# Patient Record
Sex: FEMALE | Race: WHITE | NOT HISPANIC OR LATINO
[De-identification: names, ages, dates, MRNs, and addresses within clinical notes are randomized per-mention and may not be internally consistent; named-entity substitution may affect disease eponyms.]

---
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
## 2020-01-08 ENCOUNTER — APPOINTMENT (OUTPATIENT)
Dept: ORTHOPEDIC SURGERY | Facility: AMBULATORY SURGERY CENTER | Age: 20
End: 2020-01-08
Payer: COMMERCIAL

## 2020-01-08 ENCOUNTER — OUTPATIENT (OUTPATIENT)
Dept: OUTPATIENT SERVICES | Facility: HOSPITAL | Age: 20
LOS: 1 days | Discharge: HOME | End: 2020-01-08

## 2020-01-08 PROCEDURE — 29846 WRIST ARTHROSCOPY/SURGERY: CPT | Mod: LT

## 2020-01-17 ENCOUNTER — APPOINTMENT (OUTPATIENT)
Dept: ORTHOPEDIC SURGERY | Facility: CLINIC | Age: 20
End: 2020-01-17
Payer: COMMERCIAL

## 2020-01-17 PROCEDURE — 99024 POSTOP FOLLOW-UP VISIT: CPT

## 2020-01-17 RX ORDER — ACETAMINOPHEN AND CODEINE 300; 30 MG/1; MG/1
300-30 TABLET ORAL
Qty: 20 | Refills: 0 | Status: DISCONTINUED | COMMUNITY
Start: 2020-01-07 | End: 2020-01-17

## 2020-02-21 ENCOUNTER — APPOINTMENT (OUTPATIENT)
Dept: ORTHOPEDIC SURGERY | Facility: CLINIC | Age: 20
End: 2020-02-21
Payer: COMMERCIAL

## 2020-02-21 VITALS — HEIGHT: 66 IN | WEIGHT: 140 LBS | BODY MASS INDEX: 22.5 KG/M2

## 2020-02-21 DIAGNOSIS — M24.131 OTHER ARTICULAR CARTILAGE DISORDERS, RIGHT WRIST: ICD-10-CM

## 2020-02-21 PROCEDURE — 99024 POSTOP FOLLOW-UP VISIT: CPT

## 2020-02-21 NOTE — HISTORY OF PRESENT ILLNESS
[0] : no pain reported [Healed] : healed [No Sign of Infection] : is showing no signs of infection [Doing Well] : is doing well [Constipation] : no constipation [Chills] : no chills [Dysuria] : no dysuria [Diarrhea] : no diarrhea [Fever] : no fever [Nausea] : no nausea [Vomiting] : no vomiting [Erythema] : not erythematous [Swelling] : not swollen [Discharge] : absent of discharge [de-identified] : DOS-1/8/20 [Dehiscence] : not dehisced [de-identified] : S/P-6 weeks\par Left TFCC debridement and repair. She continues O/T twice a week [de-identified] : The scars appear to be well healed with minimal sensitivity, and there is no instability. Radial, ulnar joint or carpus.\par \par No tenderness over the TFCC and no evidence of instability.\par \par Flexion/extension is 80/60 on the right and 50/40 on the left\par Pronation/supination is 80/80 on the right and 80/70 on the left\par \par  strength is 55 pounds on the right and 20 pounds on the left\par Right-hand dominant\par \par Full range of motion of the digits. Negative Tinel's. Sensation grossly intact [de-identified] : 6 weeks status post left TFCC repair by debridement [de-identified] : Continue occupational therapy and home program\par \par Return to the office in 6-8 weeks.

## 2020-04-23 ENCOUNTER — APPOINTMENT (OUTPATIENT)
Dept: PULMONOLOGY | Facility: CLINIC | Age: 20
End: 2020-04-23
Payer: COMMERCIAL

## 2020-04-23 DIAGNOSIS — R07.89 OTHER CHEST PAIN: ICD-10-CM

## 2020-04-23 PROCEDURE — 99204 OFFICE O/P NEW MOD 45 MIN: CPT | Mod: 95

## 2020-04-23 NOTE — REASON FOR VISIT
[Consultation] : a consultation [Shortness of Breath] : shortness of breath [Chest Pain] : chest pain

## 2020-04-23 NOTE — DISCUSSION/SUMMARY
[FreeTextEntry1] : Etiology of symptoms unclear. ? GERD\par ? comp. of anxiety\par Less likely parenchymal lung, airways or pulmonary vascular disease.
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
## 2020-05-04 ENCOUNTER — LABORATORY RESULT (OUTPATIENT)
Age: 20
End: 2020-05-04

## 2020-05-04 ENCOUNTER — APPOINTMENT (OUTPATIENT)
Dept: PULMONOLOGY | Facility: CLINIC | Age: 20
End: 2020-05-04
Payer: COMMERCIAL

## 2020-05-04 VITALS
RESPIRATION RATE: 14 BRPM | OXYGEN SATURATION: 99 % | SYSTOLIC BLOOD PRESSURE: 121 MMHG | HEART RATE: 66 BPM | TEMPERATURE: 98.1 F | DIASTOLIC BLOOD PRESSURE: 80 MMHG

## 2020-05-04 PROCEDURE — 94010 BREATHING CAPACITY TEST: CPT

## 2020-05-04 PROCEDURE — 99213 OFFICE O/P EST LOW 20 MIN: CPT | Mod: 25

## 2020-05-04 PROCEDURE — 71046 X-RAY EXAM CHEST 2 VIEWS: CPT

## 2020-05-04 RX ORDER — AZITHROMYCIN 250 MG/1
250 TABLET, FILM COATED ORAL
Qty: 6 | Refills: 0 | Status: DISCONTINUED | COMMUNITY
Start: 2020-03-29

## 2020-05-04 RX ORDER — PREDNISONE 5 MG/1
5 TABLET ORAL
Qty: 15 | Refills: 0 | Status: DISCONTINUED | COMMUNITY
Start: 2020-02-14

## 2020-05-04 RX ORDER — CEFUROXIME AXETIL 500 MG/1
500 TABLET ORAL
Qty: 14 | Refills: 0 | Status: DISCONTINUED | COMMUNITY
Start: 2020-02-14

## 2020-05-04 RX ORDER — EPINEPHRINE 0.3 MG/.3ML
0.3 INJECTION INTRAMUSCULAR
Qty: 2 | Refills: 0 | Status: DISCONTINUED | COMMUNITY
Start: 2020-03-05

## 2020-05-04 NOTE — PHYSICAL EXAM
[Normal Appearance] : normal appearance [Normal Oropharynx] : normal oropharynx [No Acute Distress] : no acute distress [No JVD] : no jvd [Normal S1, S2] : normal s1, s2 [Clear to Auscultation Bilaterally] : clear to auscultation bilaterally [Normal to Percussion] : normal to percussion [Benign] : benign [No HSM] : no hsm [No Clubbing] : no clubbing [No Cyanosis] : no cyanosis [No Edema] : no edema

## 2020-05-04 NOTE — DISCUSSION/SUMMARY
[FreeTextEntry1] : Etiology of symptoms unclear. ? MS\par ? comp. of anxiety\par No definitive evidence of airways disease. \par Atypical CP ? MS\par

## 2020-05-04 NOTE — ASSESSMENT
[FreeTextEntry1] : Labs drawn in office today\par Consider trial of bronchodilator therapy pending ability to obtain further workup\par Consider cardiology evaluation.

## 2020-05-04 NOTE — HISTORY OF PRESENT ILLNESS
[Never] : never [TextBox_4] : on PPI but does’t fell better.\par Lately feels tightness above left breast. \par Occ pain in back\par Occ dizzy and light headed.\par ? had feet swollen\par Occurrs both night and day\par \par No cough, no wheezing\par No difficulty working out.\par \par Taking steroid eye drops for "inflammation"\par \par

## 2020-05-04 NOTE — PROCEDURE
[FreeTextEntry1] : 05/04/2020\par Pulmonary function testing\par FEV1, FVC, and FEV1/FVC are within normal limits.

## 2020-05-05 ENCOUNTER — APPOINTMENT (OUTPATIENT)
Dept: PULMONOLOGY | Facility: CLINIC | Age: 20
End: 2020-05-05

## 2020-05-05 DIAGNOSIS — R07.89 OTHER CHEST PAIN: ICD-10-CM

## 2020-05-05 LAB
ALBUMIN SERPL ELPH-MCNC: 5 G/DL
ALP BLD-CCNC: 53 U/L
ALT SERPL-CCNC: 9 U/L
ANION GAP SERPL CALC-SCNC: 11 MMOL/L
AST SERPL-CCNC: 19 U/L
BASOPHILS # BLD AUTO: 0.03 K/UL
BASOPHILS NFR BLD AUTO: 0.4 %
BILIRUB DIRECT SERPL-MCNC: 0.1 MG/DL
BILIRUB INDIRECT SERPL-MCNC: 0.3 MG/DL
BILIRUB SERPL-MCNC: 0.4 MG/DL
BUN SERPL-MCNC: 13 MG/DL
CALCIUM SERPL-MCNC: 10 MG/DL
CHLORIDE SERPL-SCNC: 104 MMOL/L
CHOLEST SERPL-MCNC: 153 MG/DL
CHOLEST/HDLC SERPL: 3 RATIO
CO2 SERPL-SCNC: 24 MMOL/L
CREAT SERPL-MCNC: 0.87 MG/DL
CRP SERPL-MCNC: <0.1 MG/DL
EOSINOPHIL # BLD AUTO: 0.04 K/UL
EOSINOPHIL NFR BLD AUTO: 0.5 %
ERYTHROCYTE [SEDIMENTATION RATE] IN BLOOD BY WESTERGREN METHOD: 10 MM/HR
GLUCOSE SERPL-MCNC: 91 MG/DL
HCT VFR BLD CALC: 38.7 %
HDLC SERPL-MCNC: 50 MG/DL
HGB BLD-MCNC: 13.3 G/DL
IMM GRANULOCYTES NFR BLD AUTO: 0.1 %
LDLC SERPL CALC-MCNC: 90 MG/DL
LYMPHOCYTES # BLD AUTO: 2.12 K/UL
LYMPHOCYTES NFR BLD AUTO: 28 %
MAN DIFF?: NORMAL
MCHC RBC-ENTMCNC: 32.6 PG
MCHC RBC-ENTMCNC: 34.4 GM/DL
MCV RBC AUTO: 94.9 FL
MONOCYTES # BLD AUTO: 0.45 K/UL
MONOCYTES NFR BLD AUTO: 6 %
NEUTROPHILS # BLD AUTO: 4.91 K/UL
NEUTROPHILS NFR BLD AUTO: 65 %
PLATELET # BLD AUTO: 246 K/UL
POTASSIUM SERPL-SCNC: 4.6 MMOL/L
PROT SERPL-MCNC: 7.2 G/DL
RBC # BLD: 4.08 M/UL
RBC # FLD: 12.1 %
SODIUM SERPL-SCNC: 139 MMOL/L
T3 SERPL-MCNC: 81 NG/DL
T3RU NFR SERPL: 0.8 TBI
T4 FREE SERPL-MCNC: 1.2 NG/DL
T4 SERPL-MCNC: 6.3 UG/DL
TRIGL SERPL-MCNC: 61 MG/DL
TSH SERPL-ACNC: 1.26 UIU/ML
WBC # FLD AUTO: 7.56 K/UL

## 2020-05-22 NOTE — PHYSICAL EXAM
Surgeon Dwayne Henry   de-identified] : Physical exam shows the patient to be alert and oriented x3, capable of ambulation. The patient is well-developed and well-nourished in no apparent respiratory distress. Majority of the skin is intact bilaterally in the upper extremities without lymphadenopathy at the elbows.\par \par There is swelling and tenderness localized over the left wrist TFCC without instability radial ulnar joint as compared to the opposite side. Pain is significantly increased with forced pronation and supination without instability. There is no tenderness over the scaphoid scapholunate or lunotriquetral ligaments and no tenderness over the CMC joints hamate hook or pisotriquetral joint. The ECU appears to be stable without evidence of instability as compared to the opposite side.\par There is no evidence of carpal instability and there is a negative Fuentes test bilaterally.\par \par  strength is 60 pounds on the right and 20 pounds on the left limited by pain which localizes to the ulnar side of the wrist.\par Ashwin's test shows excellent flow through the radial and ulnar artery was intact arch bilaterally.\par \par There is excellent capillary of the digits bilaterally with sensation grossly intact bilaterally.\par \par There is good capillary refill of the digits bilaterally.There is no masses or sensitivity over the median and ulnar nerves at the level of the wrist. There is a negative Tinel's and negative Phalen's sign bilaterally. The sensation is grossly intact bilaterally. [de-identified] : PA lateral and oblique of the left wrist shows no evidence of arthritis with joint space is well-preserved. There is no fractures or dislocation with ulnar neutral variance present on the left side.

## 2020-08-03 ENCOUNTER — TRANSCRIPTION ENCOUNTER (OUTPATIENT)
Age: 20
End: 2020-08-03

## 2020-08-07 ENCOUNTER — APPOINTMENT (OUTPATIENT)
Dept: PULMONOLOGY | Facility: CLINIC | Age: 20
End: 2020-08-07
Payer: COMMERCIAL

## 2020-08-07 VITALS
DIASTOLIC BLOOD PRESSURE: 70 MMHG | OXYGEN SATURATION: 97 % | SYSTOLIC BLOOD PRESSURE: 113 MMHG | TEMPERATURE: 98.3 F | HEART RATE: 76 BPM

## 2020-08-07 DIAGNOSIS — R06.02 SHORTNESS OF BREATH: ICD-10-CM

## 2020-08-07 PROCEDURE — 95012 NITRIC OXIDE EXP GAS DETER: CPT

## 2020-08-07 PROCEDURE — 99213 OFFICE O/P EST LOW 20 MIN: CPT | Mod: 25

## 2020-08-07 PROCEDURE — 94060 EVALUATION OF WHEEZING: CPT

## 2020-08-07 RX ORDER — ALBUTEROL SULFATE 90 UG/1
108 (90 BASE) INHALANT RESPIRATORY (INHALATION)
Qty: 1 | Refills: 4 | Status: ACTIVE | COMMUNITY
Start: 2020-08-07 | End: 1900-01-01

## 2020-08-08 PROBLEM — R06.02 SOB (SHORTNESS OF BREATH): Status: ACTIVE | Noted: 2020-04-23

## 2020-08-08 NOTE — PROCEDURE
[FreeTextEntry1] : 08/07/2020\par Pulmonary function testing\par FEV1, FVC, and FEV1/FVC are within normal limits. There was not a significant response to inhaled bronchodilator.

## 2020-08-08 NOTE — ASSESSMENT
[FreeTextEntry1] : Trial of PRN beta agonist.\par Close observation.\par Consider repeat course of PPI if above is not beneficial.\par Patient will call or follow-up in a few weeks or sooner on a as needed basis.

## 2020-08-08 NOTE — DISCUSSION/SUMMARY
[FreeTextEntry1] : Etiology of symptom complex remains unclear.\par Cannot exclude component of mild airways reactivity which is increased nocturnally.\par Cannot exclude component related to stress and anxiety.\par Does not appear to be related to GE reflux.

## 2020-08-08 NOTE — HISTORY OF PRESENT ILLNESS
[TextBox_4] : Took acid reflux medication with ? response\par Main problem at night lying down and notes SOB, chest tightness. \par One night woke up gasping for air.\par \par Had COVID in March.\par No CXR. Feels did have lung involvement. Was SOB.\par \par Presently occ. wheeze. Not a lot of cough. Stopped PPI mid June.\par \par Sometimes problem working out.\par Worse in horizontal position.\par Had CXR in May\par \par Going back to school Monday. [address]. \par \par Had antibody test positive twice.

## 2020-08-08 NOTE — PHYSICAL EXAM
[Normal Oropharynx] : normal oropharynx [No Acute Distress] : no acute distress [No JVD] : no jvd [Normal S1, S2] : normal s1, s2 [Normal Appearance] : normal appearance [Clear to Auscultation Bilaterally] : clear to auscultation bilaterally [Normal to Percussion] : normal to percussion [No Clubbing] : no clubbing [Benign] : benign [No HSM] : no hsm [No Cyanosis] : no cyanosis [No Edema] : no edema

## 2020-08-14 ENCOUNTER — APPOINTMENT (OUTPATIENT)
Dept: ORTHOPEDIC SURGERY | Facility: CLINIC | Age: 20
End: 2020-08-14
Payer: COMMERCIAL

## 2020-08-14 VITALS — WEIGHT: 140 LBS | BODY MASS INDEX: 22.5 KG/M2 | HEIGHT: 66 IN

## 2020-08-14 DIAGNOSIS — M24.132 OTHER ARTICULAR CARTILAGE DISORDERS, LEFT WRIST: ICD-10-CM

## 2020-08-14 PROCEDURE — 99213 OFFICE O/P EST LOW 20 MIN: CPT | Mod: 95

## 2020-12-08 ENCOUNTER — TRANSCRIPTION ENCOUNTER (OUTPATIENT)
Age: 20
End: 2020-12-08

## 2022-12-14 ENCOUNTER — APPOINTMENT (OUTPATIENT)
Dept: RADIOLOGY | Facility: CLINIC | Age: 22
End: 2022-12-14

## 2022-12-14 ENCOUNTER — RESULT REVIEW (OUTPATIENT)
Age: 22
End: 2022-12-14

## 2022-12-14 ENCOUNTER — APPOINTMENT (OUTPATIENT)
Dept: RHEUMATOLOGY | Facility: CLINIC | Age: 22
End: 2022-12-14

## 2022-12-14 ENCOUNTER — OUTPATIENT (OUTPATIENT)
Dept: OUTPATIENT SERVICES | Facility: HOSPITAL | Age: 22
LOS: 1 days | End: 2022-12-14
Payer: COMMERCIAL

## 2022-12-14 ENCOUNTER — NON-APPOINTMENT (OUTPATIENT)
Age: 22
End: 2022-12-14

## 2022-12-14 VITALS
OXYGEN SATURATION: 96 % | HEART RATE: 76 BPM | DIASTOLIC BLOOD PRESSURE: 68 MMHG | SYSTOLIC BLOOD PRESSURE: 111 MMHG | WEIGHT: 150 LBS | HEIGHT: 66 IN | BODY MASS INDEX: 24.11 KG/M2 | TEMPERATURE: 97 F

## 2022-12-14 DIAGNOSIS — M54.50 LOW BACK PAIN, UNSPECIFIED: ICD-10-CM

## 2022-12-14 PROCEDURE — 72202 X-RAY EXAM SI JOINTS 3/> VWS: CPT

## 2022-12-14 PROCEDURE — 72202 X-RAY EXAM SI JOINTS 3/> VWS: CPT | Mod: 26

## 2022-12-14 PROCEDURE — 99204 OFFICE O/P NEW MOD 45 MIN: CPT

## 2022-12-14 RX ORDER — MELOXICAM 15 MG/1
15 TABLET ORAL
Qty: 30 | Refills: 0 | Status: ACTIVE | COMMUNITY
Start: 2022-12-14 | End: 1900-01-01

## 2022-12-14 RX ORDER — TIZANIDINE 4 MG/1
4 TABLET ORAL
Qty: 30 | Refills: 0 | Status: ACTIVE | COMMUNITY
Start: 2022-12-14 | End: 1900-01-01

## 2022-12-14 RX ORDER — PANTOPRAZOLE 40 MG/1
40 TABLET, DELAYED RELEASE ORAL
Qty: 30 | Refills: 1 | Status: DISCONTINUED | COMMUNITY
Start: 2020-04-23 | End: 2022-12-14

## 2022-12-27 ENCOUNTER — NON-APPOINTMENT (OUTPATIENT)
Age: 22
End: 2022-12-27

## 2023-05-16 ENCOUNTER — APPOINTMENT (OUTPATIENT)
Dept: RHEUMATOLOGY | Facility: CLINIC | Age: 23
End: 2023-05-16

## 2024-10-05 ENCOUNTER — NON-APPOINTMENT (OUTPATIENT)
Age: 24
End: 2024-10-05